# Patient Record
Sex: MALE | Race: OTHER
[De-identification: names, ages, dates, MRNs, and addresses within clinical notes are randomized per-mention and may not be internally consistent; named-entity substitution may affect disease eponyms.]

---

## 2019-07-20 ENCOUNTER — HOSPITAL ENCOUNTER (EMERGENCY)
Dept: HOSPITAL 12 - ER | Age: 24
Discharge: ANOTHER HOSPITAL | End: 2019-07-20
Payer: COMMERCIAL

## 2019-07-20 VITALS — WEIGHT: 150 LBS | HEIGHT: 68 IN | BODY MASS INDEX: 22.73 KG/M2

## 2019-07-20 DIAGNOSIS — S50.11XA: ICD-10-CM

## 2019-07-20 DIAGNOSIS — G83.4: ICD-10-CM
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
## 2019-07-20 DIAGNOSIS — S32.018A: Primary | ICD-10-CM

## 2019-07-20 DIAGNOSIS — M54.9: ICD-10-CM
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
## 2019-07-20 DIAGNOSIS — G89.29: ICD-10-CM

## 2019-07-20 DIAGNOSIS — N31.9: ICD-10-CM
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
## 2019-07-20 DIAGNOSIS — F10.129: ICD-10-CM

## 2019-07-20 LAB
ALP SERPL-CCNC: 61 U/L (ref 50–136)
ALT SERPL W/O P-5'-P-CCNC: 57 U/L (ref 16–63)
APPEARANCE UR: CLEAR
AST SERPL-CCNC: 71 U/L (ref 15–37)
BASOPHILS # BLD AUTO: 0 K/UL (ref 0–8)
BASOPHILS NFR BLD AUTO: 0.2 % (ref 0–2)
BILIRUB DIRECT SERPL-MCNC: 0.2 MG/DL (ref 0–0.2)
BILIRUB SERPL-MCNC: 0.5 MG/DL (ref 0.2–1)
BILIRUB UR QL STRIP: NEGATIVE
BUN SERPL-MCNC: 6 MG/DL (ref 7–18)
CHLORIDE SERPL-SCNC: 110 MMOL/L (ref 98–107)
CO2 SERPL-SCNC: 24 MMOL/L (ref 21–32)
COLOR UR: YELLOW
CREAT SERPL-MCNC: 0.7 MG/DL (ref 0.6–1.3)
DEPRECATED SQUAMOUS URNS QL MICRO: (no result) /HPF
EOSINOPHIL # BLD AUTO: 0 K/UL (ref 0–0.7)
EOSINOPHIL NFR BLD AUTO: 0 % (ref 0–7)
ETHANOL SERPL-MCNC: 76 MG/DL (ref 0–0)
GLUCOSE SERPL-MCNC: 122 MG/DL (ref 74–106)
GLUCOSE UR STRIP-MCNC: NEGATIVE MG/DL
HCT VFR BLD AUTO: 48.3 % (ref 36.7–47.1)
HGB BLD-MCNC: 16 G/DL (ref 12.5–16.3)
HGB UR QL STRIP: (no result)
KETONES UR STRIP-MCNC: NEGATIVE MG/DL
LEUKOCYTE ESTERASE UR QL STRIP: NEGATIVE
LYMPHOCYTES # BLD AUTO: 0.8 K/UL (ref 20–40)
LYMPHOCYTES NFR BLD AUTO: 4.9 % (ref 20.5–51.5)
MCH RBC QN AUTO: 26.5 UUG (ref 23.8–33.4)
MCHC RBC AUTO-ENTMCNC: 33 G/DL (ref 32.5–36.3)
MCV RBC AUTO: 80.1 FL (ref 73–96.2)
MONOCYTES # BLD AUTO: 1.2 K/UL (ref 2–10)
MONOCYTES NFR BLD AUTO: 8 % (ref 0–11)
NEUTROPHILS # BLD AUTO: 13.5 K/UL (ref 1.8–8.9)
NEUTROPHILS NFR BLD AUTO: 86.9 % (ref 38.5–71.5)
NITRITE UR QL STRIP: NEGATIVE
PH UR STRIP: 7 [PH] (ref 5–8)
PLATELET # BLD AUTO: 233 K/UL (ref 152–348)
POTASSIUM SERPL-SCNC: 3.5 MMOL/L (ref 3.5–5.1)
RBC # BLD AUTO: 6.03 MIL/UL (ref 4.06–5.63)
RBC #/AREA URNS HPF: (no result) /HPF (ref 0–3)
SP GR UR STRIP: 1.01 (ref 1–1.03)
UROBILINOGEN UR STRIP-MCNC: 0.2 E.U./DL
WBC # BLD AUTO: 15.5 K/UL (ref 3.6–10.2)
WBC #/AREA URNS HPF: (no result) /HPF
WBC #/AREA URNS HPF: (no result) /HPF (ref 0–3)
WS STN SPEC: 7.5 G/DL (ref 6.4–8.2)

## 2019-07-20 PROCEDURE — 85025 COMPLETE CBC W/AUTO DIFF WBC: CPT

## 2019-07-20 PROCEDURE — 73090 X-RAY EXAM OF FOREARM: CPT

## 2019-07-20 PROCEDURE — 99285 EMERGENCY DEPT VISIT HI MDM: CPT

## 2019-07-20 PROCEDURE — A4663 DIALYSIS BLOOD PRESSURE CUFF: HCPCS

## 2019-07-20 PROCEDURE — 86901 BLOOD TYPING SEROLOGIC RH(D): CPT

## 2019-07-20 PROCEDURE — 96374 THER/PROPH/DIAG INJ IV PUSH: CPT

## 2019-07-20 PROCEDURE — 80076 HEPATIC FUNCTION PANEL: CPT

## 2019-07-20 PROCEDURE — 96376 TX/PRO/DX INJ SAME DRUG ADON: CPT

## 2019-07-20 PROCEDURE — 85730 THROMBOPLASTIN TIME PARTIAL: CPT

## 2019-07-20 PROCEDURE — 81000 URINALYSIS NONAUTO W/SCOPE: CPT

## 2019-07-20 PROCEDURE — 80048 BASIC METABOLIC PNL TOTAL CA: CPT

## 2019-07-20 PROCEDURE — 72125 CT NECK SPINE W/O DYE: CPT

## 2019-07-20 PROCEDURE — 74177 CT ABD & PELVIS W/CONTRAST: CPT

## 2019-07-20 PROCEDURE — G0480 DRUG TEST DEF 1-7 CLASSES: HCPCS

## 2019-07-20 PROCEDURE — 86850 RBC ANTIBODY SCREEN: CPT

## 2019-07-20 PROCEDURE — 96375 TX/PRO/DX INJ NEW DRUG ADDON: CPT

## 2019-07-20 PROCEDURE — 81001 URINALYSIS AUTO W/SCOPE: CPT

## 2019-07-20 PROCEDURE — 36415 COLL VENOUS BLD VENIPUNCTURE: CPT

## 2019-07-20 PROCEDURE — 72100 X-RAY EXAM L-S SPINE 2/3 VWS: CPT

## 2019-07-20 PROCEDURE — 86900 BLOOD TYPING SEROLOGIC ABO: CPT

## 2019-07-20 NOTE — NUR
Patient Tranfers to outside Facility



Physician: DANIELA PATTERSON



Location: [facility]

## 2019-07-20 NOTE — NUR
TRANSFER REPORT GIVEN TO [facility], PRIVATE AMBULANCE, [address]. [place], 
PARAMEDIC. MADE AWARE OF SPINAL PRECAUTION.

## 2019-07-20 NOTE — NUR
PT TO BE TRANSFERRED TO [facility], ACCEPTED BY DR. MARRERO, N. CALL 
[phone] FOR REPORT.



[facility] ETA [id number]

## 2019-07-20 NOTE — NUR
[facility] per  request. Spoke with Victoriano, requested 
information provided, Dr. Caldwell to call back.
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
